# Patient Record
(demographics unavailable — no encounter records)

---

## 2025-06-11 NOTE — PHYSICAL EXAM
[No Acute Distress] : no acute distress [Well Nourished] : well nourished [Well Developed] : well developed [Normal Sclera/Conjunctiva] : normal sclera/conjunctiva [Well-Appearing] : well-appearing [PERRL] : pupils equal round and reactive to light [EOMI] : extraocular movements intact [Normal Outer Ear/Nose] : the outer ears and nose were normal in appearance [Normal Oropharynx] : the oropharynx was normal [No JVD] : no jugular venous distention [No Lymphadenopathy] : no lymphadenopathy [Supple] : supple [Thyroid Normal, No Nodules] : the thyroid was normal and there were no nodules present [No Accessory Muscle Use] : no accessory muscle use [No Respiratory Distress] : no respiratory distress  [Clear to Auscultation] : lungs were clear to auscultation bilaterally [Normal Rate] : normal rate  [Normal S1, S2] : normal S1 and S2 [Regular Rhythm] : with a regular rhythm [No Murmur] : no murmur heard [No Carotid Bruits] : no carotid bruits [No Abdominal Bruit] : a ~M bruit was not heard ~T in the abdomen [No Varicosities] : no varicosities [Pedal Pulses Present] : the pedal pulses are present [No Edema] : there was no peripheral edema [No Palpable Aorta] : no palpable aorta [No Extremity Clubbing/Cyanosis] : no extremity clubbing/cyanosis [Soft] : abdomen soft [Non Tender] : non-tender [Non-distended] : non-distended [No HSM] : no HSM [Normal Bowel Sounds] : normal bowel sounds [Normal Posterior Cervical Nodes] : no posterior cervical lymphadenopathy [Normal Anterior Cervical Nodes] : no anterior cervical lymphadenopathy [No CVA Tenderness] : no CVA  tenderness [No Spinal Tenderness] : no spinal tenderness [No Joint Swelling] : no joint swelling [Grossly Normal Strength/Tone] : grossly normal strength/tone [No Rash] : no rash [Coordination Grossly Intact] : coordination grossly intact [No Focal Deficits] : no focal deficits [Normal Gait] : normal gait [Deep Tendon Reflexes (DTR)] : deep tendon reflexes were 2+ and symmetric [Normal Affect] : the affect was normal [Normal Insight/Judgement] : insight and judgment were intact [No Masses] : no palpable masses [No Nipple Discharge] : no nipple discharge [Normal Sphincter Tone] : normal sphincter tone [No Axillary Lymphadenopathy] : no axillary lymphadenopathy [Stool Occult Blood] : stool negative for occult blood [No Mass] : no mass

## 2025-06-11 NOTE — HEALTH RISK ASSESSMENT
[2 - 4 times a month (2 pts)] : 2-4 times a month (2 points) [1 or 2 (0 pts)] : 1 or 2 (0 points) [Never (0 pts)] : Never (0 points) [No] : In the past 12 months have you used drugs other than those required for medical reasons? No [0] : 2) Feeling down, depressed, or hopeless: Not at all (0) [No falls in past year] : Patient reported no falls in the past year [PHQ-2 Negative - No further assessment needed] : PHQ-2 Negative - No further assessment needed [Yes] : takes [Former] : Former [10-14] : 10-14 [> 15 Years] : > 15 Years [NO] : No [None] : None [Alone] : lives alone [] :  [Fully functional (bathing, dressing, toileting, transferring, walking, feeding)] : Fully functional (bathing, dressing, toileting, transferring, walking, feeding) [Fully functional (using the telephone, shopping, preparing meals, housekeeping, doing laundry, using] : Fully functional and needs no help or supervision to perform IADLs (using the telephone, shopping, preparing meals, housekeeping, doing laundry, using transportation, managing medications and managing finances) [Reports changes in hearing] : Reports changes in hearing [Smoke Detector] : smoke detector [Carbon Monoxide Detector] : carbon monoxide detector [Seat Belt] :  uses seat belt [Sunscreen] : uses sunscreen [With Patient/Caregiver] : , with patient/caregiver [de-identified] : walking/    weight  [de-identified] : reg [KOC2Nfnkb] : 0 [EyeExamDate] : 01/2025 [Change in mental status noted] : No change in mental status noted [MammogramDate] : 7/12/24 [PapSmearComments] : seen 7/24 [BoneDensityDate] : 09/2023 [ColonoscopyDate] : 6/10/24 [BoneDensityComments] : [facility]  [ColonoscopyComments] : ifobt  [AdvancecareDate] : 6/11/25

## 2025-06-11 NOTE — ASSESSMENT
[Vaccines Reviewed] : Immunizations reviewed today. Please see immunization details in the vaccine log within the immunization flowsheet.  [FreeTextEntry1] : All preventative measures were reviewed with the patient and the patient is due for and agrees to the following as outlined  in the plan  below.  Lab were reviewed in detail with the patient. Tdap today

## 2025-06-24 NOTE — CONSULT LETTER
[Dear  ___] : Dear  [unfilled], [Courtesy Letter:] : I had the pleasure of seeing your patient, [unfilled], in my office today. [Sincerely,] : Sincerely, [FreeTextEntry2] : Ivanna Kim  [address] [place] [phone]  [FreeTextEntry1] : This is a very pleasant 78-year-old retired  presenting to our neurosurgical office for consultation.  The patient is being seen for chronic neck, shoulder, and arm pain.  The pain began since October 2024 without any trauma or injury.  The pain begins on the right side of the neck and radiates into the right shoulder and upper arm.  The pain is described as an achy pinching sensation.  The patient reports that due to the pain her right arm feels weak.  The patient endorses tightness in the neck as well.  The patient has no hand symptoms.  There is no associated tingling and numbness.  The patient has difficulty sleeping.  She reports her pain overall at a 7/10.  The patient has no gait abnormalities.  The patient has daily intermittent pain.  She denies any coordination issues.  There are no gait abnormalities.  No bowel or bladder dysfunction symptoms.  The patient is an avid golfer and has stopped playing golf due to her pain.  The patient has seen an orthopedic physician and was diagnosed with cervical radiculopathy but her symptoms are ongoing despite being treated with physical therapy since January 2025.  Most recently, 2 months ago the patient started chiropractic therapy which helped initially and now is no longer effective.  The patient has taken steroids in October 2024 with no lasting effect.  The patient continues to take anti-inflammatory medications on a as needed basis with mild relief.  There are no spine images to review.  On neurologic examination, the patient is alert and oriented.  Appears well and in no distress.  Speech clear and fluent.  CN intact.    Good Rom of her neck with stiffness noted on turning side to side.  Full strength in all muscle groups.  Reflexes are 2+ in all extremities and equal and symmetric throughout.  No De sign bilaterally.   No abnormal extraneous movements.  No clonus.  Romberg is absent.   Gait is steady. Tandem gait normal. Able to walk on heels and toes without difficulty.    The patient is suffering from a chronic history of right-sided cervical radiculopathy since October 2024 without any incidents.  I recommended that the patient continue chiropractic therapy since that provided the most benefit.  I have ordered an MRI of the cervical spine to rule out any cervical stenosis or cord compression.  A flexion-extension x-ray of the cervical spine was ordered to assess for any dynamic instability.  I have recommended that the patient return back to the office in 4 to 6 weeks for reevaluation.  If the patient has any concerns or changes in her symptoms she will contact the office sooner.  The patient a good understanding of the plan.  Thank you for very kindly including me in the evaluation and treatment of your patient.  Please do not hesitate to contact me should you have any concerns or questions regarding the patients right sided cervical radiculopathy or proposed follow-up treatment and evaluation plan.

## 2025-06-24 NOTE — REASON FOR VISIT
[New Patient Visit] : a new patient visit [Other: _____] : [unfilled] [FreeTextEntry1] : Right sided cervical radiculopathy

## 2025-06-24 NOTE — CONSULT LETTER
[Dear  ___] : Dear  [unfilled], [Courtesy Letter:] : I had the pleasure of seeing your patient, [unfilled], in my office today. [Sincerely,] : Sincerely, [FreeTextEntry2] : Ivanna Kim  [address] [place] [id number]  [FreeTextEntry1] : This is a very pleasant 78-year-old retired  presenting to our neurosurgical office for consultation.  The patient is being seen for chronic neck, shoulder, and arm pain.  The pain began since October 2024 without any trauma or injury.  The pain begins on the right side of the neck and radiates into the right shoulder and upper arm.  The pain is described as an achy pinching sensation.  The patient reports that due to the pain her right arm feels weak.  The patient endorses tightness in the neck as well.  The patient has no hand symptoms.  There is no associated tingling and numbness.  The patient has difficulty sleeping.  She reports her pain overall at a 7/10.  The patient has no gait abnormalities.  The patient has daily intermittent pain.  She denies any coordination issues.  There are no gait abnormalities.  No bowel or bladder dysfunction symptoms.  The patient is an avid golfer and has stopped playing golf due to her pain.  The patient has seen an orthopedic physician and was diagnosed with cervical radiculopathy but her symptoms are ongoing despite being treated with physical therapy since January 2025.  Most recently, 2 months ago the patient started chiropractic therapy which helped initially and now is no longer effective.  The patient has taken steroids in October 2024 with no lasting effect.  The patient continues to take anti-inflammatory medications on a as needed basis with mild relief.  There are no spine images to review.  On neurologic examination, the patient is alert and oriented.  Appears well and in no distress.  Speech clear and fluent.  CN intact.    Good Rom of her neck with stiffness noted on turning side to side.  Full strength in all muscle groups.  Reflexes are 2+ in all extremities and equal and symmetric throughout.  No De sign bilaterally.   No abnormal extraneous movements.  No clonus.  Romberg is absent.   Gait is steady. Tandem gait normal. Able to walk on heels and toes without difficulty.    The patient is suffering from a chronic history of right-sided cervical radiculopathy since October 2024 without any incidents.  I recommended that the patient continue chiropractic therapy since that provided the most benefit.  I have ordered an MRI of the cervical spine to rule out any cervical stenosis or cord compression.  A flexion-extension x-ray of the cervical spine was ordered to assess for any dynamic instability.  I have recommended that the patient return back to the office in 4 to 6 weeks for reevaluation.  If the patient has any concerns or changes in her symptoms she will contact the office sooner.  The patient a good understanding of the plan.  Thank you for very kindly including me in the evaluation and treatment of your patient.  Please do not hesitate to contact me should you have any concerns or questions regarding the patients right sided cervical radiculopathy or proposed follow-up treatment and evaluation plan.

## 2025-07-15 NOTE — CONSULT LETTER
[Dear  ___] : Dear  [unfilled], [Courtesy Letter:] : I had the pleasure of seeing your patient, [unfilled], in my office today. [Sincerely,] : Sincerely, [FreeTextEntry1] : Mrs. Mcgill is a very pleasant 71-year-old female patient who was seen in our office today regarding right-sided neck and shoulder pain.  The patient was previously assessed by our nurse practitioner who ordered advanced imaging which was reviewed today.  The patient endorses an onset of pain starting around October 2024.  The patient do not recall any specific inciting event but states that she was playing golf around the time of onset.  The patient's pain is localized to the right side and radiates from the neck to the shoulder.  The patient does not endorse any radiating symptoms into the anterior aspect of the neck or into the hands.  The patient denies any numbness or tingling.  The patient denies any weakness.  The patient endorses pain with movements of the neck as well as movements of the shoulder.  The patient's pain is intermittent and not constant.  On examination, the patient is alert, oriented, and compliant with the exam.  The patient demonstrates full strength in the upper and lower extremities bilaterally.  The patient endorses focal pain in the shoulder with passive and active extension with abduction.  The patient also endorses neck discomfort with rotation towards the right but not the left.  Neither of these maneuvers cause neurologic symptoms.  The patient is accompanied with an MRI scan of the cervical spine dated June 30, 2025.  These images demonstrate loss of cervical lordosis as well as a spondylolisthesis at C3/4.  There are disc bulges throughout the cervical spine but the axial views are essentially uninterpretable due to poor image quality.  According to the patient, the MRI machine broke down senior care through her study.  Foraminal stenosis is possible at C3/4 and C5/6 on the right.  Taken together, the patient has a clinical history and radiographic findings most consistent with musculoskeletal causes for her right-sided shoulder and neck pain.  However, foraminal stenosis at C3/4 on the right might also contribute to the patient's pain right sided neck and shoulder pain. I have recommended evaluation by a physiatrist for a more specific diagnosis and treatments.  I have provided an updated physical therapy with specific emphasis on shoulder and neck range of motion.  The patient has been provided naproxen and muscle relaxants to help from a symptomatic perspective.  The patient is satisfied with the explanation and treatment plan at today's visit and will be following up with our office depending on her symptom progression. [FreeTextEntry3] :  Randy Rowe MD, PhD, FRCPSC Attending Neurosurgeon [address], [address] [place] [address] Office: [phone] Fax: [phone]

## 2025-07-15 NOTE — CONSULT LETTER
[Dear  ___] : Dear  [unfilled], [Courtesy Letter:] : I had the pleasure of seeing your patient, [unfilled], in my office today. [Sincerely,] : Sincerely, [FreeTextEntry1] : Mrs. Mcgill is a very pleasant 71-year-old female patient who was seen in our office today regarding right-sided neck and shoulder pain.  The patient was previously assessed by our nurse practitioner who ordered advanced imaging which was reviewed today.  The patient endorses an onset of pain starting around October 2024.  The patient do not recall any specific inciting event but states that she was playing golf around the time of onset.  The patient's pain is localized to the right side and radiates from the neck to the shoulder.  The patient does not endorse any radiating symptoms into the anterior aspect of the neck or into the hands.  The patient denies any numbness or tingling.  The patient denies any weakness.  The patient endorses pain with movements of the neck as well as movements of the shoulder.  The patient's pain is intermittent and not constant.  On examination, the patient is alert, oriented, and compliant with the exam.  The patient demonstrates full strength in the upper and lower extremities bilaterally.  The patient endorses focal pain in the shoulder with passive and active extension with abduction.  The patient also endorses neck discomfort with rotation towards the right but not the left.  Neither of these maneuvers cause neurologic symptoms.  The patient is accompanied with an MRI scan of the cervical spine dated June 30, 2025.  These images demonstrate loss of cervical lordosis as well as a spondylolisthesis at C3/4.  There are disc bulges throughout the cervical spine but the axial views are essentially uninterpretable due to poor image quality.  According to the patient, the MRI machine broke down half-way through her study.  Foraminal stenosis is possible at C3/4 and C5/6 on the right.  Taken together, the patient has a clinical history and radiographic findings most consistent with musculoskeletal causes for her right-sided shoulder and neck pain.  However, foraminal stenosis at C3/4 on the right might also contribute to the patient's pain right sided neck and shoulder pain. I have recommended evaluation by a physiatrist for a more specific diagnosis and treatments.  I have provided an updated physical therapy with specific emphasis on shoulder and neck range of motion.  The patient has been provided naproxen and muscle relaxants to help from a symptomatic perspective.  The patient is satisfied with the explanation and treatment plan at today's visit and will be following up with our office depending on her symptom progression. [FreeTextEntry3] :  Randy Rowe MD, PhD, FRCPSC Attending Neurosurgeon [address], [address] [place] [address] Office: [phone] Fax: [phone]